# Patient Record
Sex: MALE | Race: WHITE | Employment: FULL TIME | URBAN - METROPOLITAN AREA
[De-identification: names, ages, dates, MRNs, and addresses within clinical notes are randomized per-mention and may not be internally consistent; named-entity substitution may affect disease eponyms.]

---

## 2017-09-10 DIAGNOSIS — M77.8 TENDINITIS OF ELBOW: ICD-10-CM

## 2017-09-11 RX ORDER — IBUPROFEN 800 MG/1
TABLET ORAL
Qty: 180 TABLET | Refills: 3 | OUTPATIENT
Start: 2017-09-11

## 2017-09-14 DIAGNOSIS — L65.9 ALOPECIA: ICD-10-CM

## 2017-09-14 DIAGNOSIS — M77.8 TENDINITIS OF ELBOW: ICD-10-CM

## 2017-09-14 RX ORDER — IBUPROFEN 800 MG/1
TABLET ORAL
Qty: 180 TABLET | Refills: 3 | Status: SHIPPED | OUTPATIENT
Start: 2017-09-14 | End: 2018-02-15

## 2017-09-14 RX ORDER — FINASTERIDE 5 MG/1
5 TABLET, FILM COATED ORAL DAILY
Qty: 30 TABLET | Refills: 3 | Status: SHIPPED | OUTPATIENT
Start: 2017-09-14 | End: 2018-02-15

## 2018-02-05 DIAGNOSIS — L65.9 ALOPECIA: ICD-10-CM

## 2018-02-05 RX ORDER — FINASTERIDE 5 MG/1
5 TABLET, FILM COATED ORAL DAILY
Qty: 30 TABLET | Refills: 3 | OUTPATIENT
Start: 2018-02-05 | End: 2019-02-05

## 2018-02-15 ENCOUNTER — OFFICE VISIT (OUTPATIENT)
Dept: PRIMARY CARE CLINIC | Age: 48
End: 2018-02-15
Payer: COMMERCIAL

## 2018-02-15 VITALS
HEIGHT: 73 IN | BODY MASS INDEX: 24.31 KG/M2 | RESPIRATION RATE: 16 BRPM | OXYGEN SATURATION: 98 % | SYSTOLIC BLOOD PRESSURE: 122 MMHG | TEMPERATURE: 97.5 F | WEIGHT: 183.4 LBS | HEART RATE: 87 BPM | DIASTOLIC BLOOD PRESSURE: 80 MMHG

## 2018-02-15 DIAGNOSIS — Z00.00 PREVENTATIVE HEALTH CARE: Primary | ICD-10-CM

## 2018-02-15 PROCEDURE — 99396 PREV VISIT EST AGE 40-64: CPT | Performed by: INTERNAL MEDICINE

## 2018-02-15 RX ORDER — FINASTERIDE 5 MG/1
5 TABLET, FILM COATED ORAL DAILY
Qty: 90 TABLET | Refills: 3 | Status: CANCELLED | OUTPATIENT
Start: 2018-02-15 | End: 2019-02-15

## 2018-02-15 ASSESSMENT — ENCOUNTER SYMPTOMS
SHORTNESS OF BREATH: 0
VOMITING: 0
NAUSEA: 0
TROUBLE SWALLOWING: 0
CHOKING: 0
VOICE CHANGE: 0
PHOTOPHOBIA: 0

## 2018-02-15 ASSESSMENT — PATIENT HEALTH QUESTIONNAIRE - PHQ9
SUM OF ALL RESPONSES TO PHQ QUESTIONS 1-9: 0
SUM OF ALL RESPONSES TO PHQ9 QUESTIONS 1 & 2: 0
2. FEELING DOWN, DEPRESSED OR HOPELESS: 0
1. LITTLE INTEREST OR PLEASURE IN DOING THINGS: 0

## 2018-02-15 NOTE — PROGRESS NOTES
Bouchra Man 50 y.o. male presents today with   Chief Complaint   Patient presents with    Annual Exam     physical, patient states he will be getting labwork done for his insurance next week. patient would like a refill on finasteride, but discuss adjusting the dosage. HPIannual exam  He off the proscar, he was taking it for alopecia. He is of the motrin. Past Medical History:   Diagnosis Date    Alopecia      There are no active problems to display for this patient. No past surgical history on file. No family history on file. Social History     Social History    Marital status:      Spouse name: N/A    Number of children: N/A    Years of education: N/A     Social History Main Topics    Smoking status: Never Smoker    Smokeless tobacco: Never Used    Alcohol use Yes      Comment: 2 beers daily    Drug use: No    Sexual activity: Not Asked     Other Topics Concern    None     Social History Narrative    None     No Known Allergies    Review of Systems   Constitutional: Negative for fatigue and fever. HENT: Negative for trouble swallowing and voice change. Eyes: Negative for photophobia and visual disturbance. Respiratory: Negative for choking and shortness of breath. Cardiovascular: Negative for chest pain and palpitations. Gastrointestinal: Negative for nausea and vomiting. Genitourinary: Negative for decreased urine volume, testicular pain and urgency. Skin: Negative for rash. Neurological: Negative for tremors and syncope. Hematological: Does not bruise/bleed easily. Psychiatric/Behavioral: Negative for suicidal ideas. Vitals:    02/15/18 1011   BP: 122/80   Site: Right Arm   Position: Sitting   Cuff Size: Small Adult   Pulse: 87   Resp: 16   Temp: 97.5 °F (36.4 °C)   TempSrc: Tympanic   SpO2: 98%   Weight: 183 lb 6.4 oz (83.2 kg)   Height: 6' 1\" (1.854 m)       Physical Exam   Constitutional: He appears well-developed and well-nourished.    HENT: Head: Normocephalic and atraumatic. Eyes: Conjunctivae and EOM are normal. Pupils are equal, round, and reactive to light. Neck: Normal range of motion. No thyromegaly present. Cardiovascular: Normal rate and regular rhythm. Pulmonary/Chest: Effort normal. No respiratory distress. He has no wheezes. Abdominal: Soft. Bowel sounds are normal. He exhibits no distension. Musculoskeletal: Normal range of motion. He exhibits no edema. Neurological: He is alert. Skin: Skin is warm. Psychiatric: He has a normal mood and affect. Assessment/Plan  1. Preventative health care  PSA Screening    Comprehensive Metabolic Panel    CBC With Auto Differential    Lipid Panel         Return if symptoms worsen or fail to improve.     Manolo Jernigan MD

## 2018-04-25 DIAGNOSIS — L65.9 ALOPECIA: ICD-10-CM

## 2018-04-26 RX ORDER — FINASTERIDE 5 MG/1
5 TABLET, FILM COATED ORAL DAILY
Qty: 90 TABLET | Refills: 1 | Status: SHIPPED | OUTPATIENT
Start: 2018-04-26 | End: 2018-11-06 | Stop reason: SDUPTHER

## 2018-11-06 DIAGNOSIS — L65.9 ALOPECIA: ICD-10-CM

## 2018-11-06 RX ORDER — FINASTERIDE 5 MG/1
5 TABLET, FILM COATED ORAL DAILY
Qty: 90 TABLET | Refills: 1 | Status: SHIPPED | OUTPATIENT
Start: 2018-11-06 | End: 2019-05-17 | Stop reason: SDUPTHER

## 2018-11-21 DIAGNOSIS — L65.9 ALOPECIA: ICD-10-CM

## 2018-11-21 RX ORDER — FINASTERIDE 5 MG/1
5 TABLET, FILM COATED ORAL DAILY
Qty: 90 TABLET | Refills: 1 | Status: SHIPPED | OUTPATIENT
Start: 2018-11-21 | End: 2020-01-10

## 2019-05-17 DIAGNOSIS — L65.9 ALOPECIA: ICD-10-CM

## 2019-05-17 RX ORDER — FINASTERIDE 5 MG/1
5 TABLET, FILM COATED ORAL DAILY
Qty: 90 TABLET | Refills: 1 | Status: SHIPPED | OUTPATIENT
Start: 2019-05-17 | End: 2020-01-10 | Stop reason: SDUPTHER

## 2020-01-10 ENCOUNTER — OFFICE VISIT (OUTPATIENT)
Dept: PRIMARY CARE CLINIC | Age: 50
End: 2020-01-10
Payer: COMMERCIAL

## 2020-01-10 VITALS
HEIGHT: 72 IN | TEMPERATURE: 97.8 F | RESPIRATION RATE: 14 BRPM | OXYGEN SATURATION: 98 % | WEIGHT: 185.4 LBS | HEART RATE: 77 BPM | SYSTOLIC BLOOD PRESSURE: 122 MMHG | DIASTOLIC BLOOD PRESSURE: 76 MMHG | BODY MASS INDEX: 25.11 KG/M2

## 2020-01-10 PROCEDURE — G8484 FLU IMMUNIZE NO ADMIN: HCPCS | Performed by: INTERNAL MEDICINE

## 2020-01-10 PROCEDURE — 99396 PREV VISIT EST AGE 40-64: CPT | Performed by: INTERNAL MEDICINE

## 2020-01-10 RX ORDER — FINASTERIDE 5 MG/1
5 TABLET, FILM COATED ORAL
COMMUNITY
Start: 2018-11-21 | End: 2020-01-10

## 2020-01-10 RX ORDER — FINASTERIDE 5 MG/1
5 TABLET, FILM COATED ORAL DAILY
Qty: 90 TABLET | Refills: 3 | Status: SHIPPED | OUTPATIENT
Start: 2020-01-10 | End: 2021-01-09

## 2020-01-10 RX ORDER — MECLIZINE HCL 12.5 MG/1
12.5 TABLET ORAL
COMMUNITY
Start: 2019-01-30

## 2020-01-10 SDOH — ECONOMIC STABILITY: FOOD INSECURITY: WITHIN THE PAST 12 MONTHS, THE FOOD YOU BOUGHT JUST DIDN'T LAST AND YOU DIDN'T HAVE MONEY TO GET MORE.: NEVER TRUE

## 2020-01-10 SDOH — ECONOMIC STABILITY: INCOME INSECURITY: HOW HARD IS IT FOR YOU TO PAY FOR THE VERY BASICS LIKE FOOD, HOUSING, MEDICAL CARE, AND HEATING?: NOT HARD AT ALL

## 2020-01-10 SDOH — ECONOMIC STABILITY: FOOD INSECURITY: WITHIN THE PAST 12 MONTHS, YOU WORRIED THAT YOUR FOOD WOULD RUN OUT BEFORE YOU GOT MONEY TO BUY MORE.: NEVER TRUE

## 2020-01-10 ASSESSMENT — PATIENT HEALTH QUESTIONNAIRE - PHQ9
1. LITTLE INTEREST OR PLEASURE IN DOING THINGS: 0
2. FEELING DOWN, DEPRESSED OR HOPELESS: 0
SUM OF ALL RESPONSES TO PHQ QUESTIONS 1-9: 0
SUM OF ALL RESPONSES TO PHQ QUESTIONS 1-9: 0
SUM OF ALL RESPONSES TO PHQ9 QUESTIONS 1 & 2: 0

## 2020-01-10 ASSESSMENT — ENCOUNTER SYMPTOMS
TROUBLE SWALLOWING: 0
VOICE CHANGE: 0
PHOTOPHOBIA: 0
CHOKING: 0
NAUSEA: 0
SHORTNESS OF BREATH: 0
VOMITING: 0

## 2020-01-10 NOTE — PROGRESS NOTES
Cardiovascular: Negative for chest pain and palpitations. Gastrointestinal: Negative for abdominal distention, constipation, nausea and vomiting. Genitourinary: Negative for decreased urine volume, testicular pain and urgency. Skin: Negative for rash. Neurological: Positive for dizziness (recurrent vertigo). Negative for tremors and syncope. Hematological: Does not bruise/bleed easily. Psychiatric/Behavioral: Positive for agitation. Negative for suicidal ideas. Vitals:    01/10/20 0920   BP: 122/76   Pulse: 77   Resp: 14   Temp: 97.8 °F (36.6 °C)   SpO2: 98%   Weight: 185 lb 6.4 oz (84.1 kg)   Height: 6' (1.829 m)       Physical Exam  Constitutional:       Appearance: He is well-developed. HENT:      Head: Normocephalic and atraumatic. Nose: Nose normal.      Mouth/Throat:      Mouth: Mucous membranes are moist.   Eyes:      Conjunctiva/sclera: Conjunctivae normal.      Pupils: Pupils are equal, round, and reactive to light. Neck:      Musculoskeletal: Normal range of motion. Cardiovascular:      Rate and Rhythm: Normal rate and regular rhythm. Pulmonary:      Effort: Pulmonary effort is normal. No respiratory distress. Breath sounds: No wheezing or rales. Abdominal:      General: Bowel sounds are normal. There is no distension. Palpations: Abdomen is soft. Tenderness: There is no tenderness. Musculoskeletal: Normal range of motion. Skin:     General: Skin is dry. Coloration: Skin is not jaundiced. Neurological:      Mental Status: He is alert. Cranial Nerves: No cranial nerve deficit. Psychiatric:         Mood and Affect: Mood normal.       Assessment/Plan  Reinaldo Brain was seen today for check-up. Diagnoses and all orders for this visit:    Preventative health care  -     Comprehensive Metabolic Panel; Future  -     CBC With Auto Differential; Future  -     Lipid Panel;  Future    Gastroesophageal reflux disease without esophagitis  - [facility], [facility]    Alopecia  -     finasteride (PROSCAR) 5 MG tablet; Take 1 tablet by mouth daily    Raynaud's disease without gangrene  -     DARRELL; Future    Vitamin D deficiency    Vitamin B 12 deficiency  -     Vitamin B12; Future    Hypothyroidism, unspecified type  -     TSH Without Reflex; Future    Hyperglycemia  -     Hemoglobin A1C; Future    Vegetarian diet  -     Iron And Tibc; Future    Prostate cancer screening  -     PSA Screening; Future    Colon cancer screening  -     [facility], [facility]        No follow-ups on file.     Nena Christianson MD

## 2020-01-11 LAB
ALBUMIN SERPL-MCNC: 4.3 G/DL
ALP BLD-CCNC: 73 U/L
ALT SERPL-CCNC: 29 U/L
ANA TITER: NEGATIVE
ANION GAP SERPL CALCULATED.3IONS-SCNC: NORMAL MMOL/L
AST SERPL-CCNC: 19 U/L
AVERAGE GLUCOSE: NORMAL
BASOPHILS ABSOLUTE: 52 /ΜL
BASOPHILS RELATIVE PERCENT: 1 %
BILIRUB SERPL-MCNC: 0.6 MG/DL (ref 0.1–1.4)
BUN BLDV-MCNC: 14 MG/DL
CALCIUM SERPL-MCNC: 9.5 MG/DL
CHLORIDE BLD-SCNC: 103 MMOL/L
CHOLESTEROL, TOTAL: 253 MG/DL
CHOLESTEROL/HDL RATIO: 4
CO2: 31 MMOL/L
CREAT SERPL-MCNC: 0.72 MG/DL
EOSINOPHILS ABSOLUTE: 109 /ΜL
EOSINOPHILS RELATIVE PERCENT: 2.1 %
GFR CALCULATED: NORMAL
GLUCOSE BLD-MCNC: 106 MG/DL
HBA1C MFR BLD: 5.5 %
HCT VFR BLD CALC: 43.8 % (ref 41–53)
HDLC SERPL-MCNC: 64 MG/DL (ref 35–70)
HEMOGLOBIN: 14.9 G/DL (ref 13.5–17.5)
IRON: 130
LDL CHOLESTEROL CALCULATED: 166 MG/DL (ref 0–160)
LYMPHOCYTES ABSOLUTE: 1648 /ΜL
LYMPHOCYTES RELATIVE PERCENT: 31.7 %
MCH RBC QN AUTO: 30.7 PG
MCHC RBC AUTO-ENTMCNC: 34 G/DL
MCV RBC AUTO: 90.1 FL
MONOCYTES ABSOLUTE: 515 /ΜL
MONOCYTES RELATIVE PERCENT: 9.9 %
NEUTROPHILS ABSOLUTE: 2876 /ΜL
NEUTROPHILS RELATIVE PERCENT: 55.3 %
PDW BLD-RTO: 12.8 %
PLATELET # BLD: 244 K/ΜL
PMV BLD AUTO: 11.5 FL
POTASSIUM SERPL-SCNC: 4.5 MMOL/L
PROSTATE SPECIFIC ANTIGEN: 0.6 NG/ML
RBC # BLD: 4.86 10^6/ΜL
SODIUM BLD-SCNC: 140 MMOL/L
TOTAL IRON BINDING CAPACITY: 356
TOTAL PROTEIN: 7.2
TRIGL SERPL-MCNC: 112 MG/DL
TSH SERPL DL<=0.05 MIU/L-ACNC: 4.39 UIU/ML
VITAMIN B-12: 776
VLDLC SERPL CALC-MCNC: ABNORMAL MG/DL
WBC # BLD: 5.2 10^3/ML

## 2020-01-15 ASSESSMENT — ENCOUNTER SYMPTOMS
ABDOMINAL DISTENTION: 0
CONSTIPATION: 0

## 2020-02-04 ENCOUNTER — OFFICE VISIT (OUTPATIENT)
Dept: GASTROENTEROLOGY | Age: 50
End: 2020-02-04
Payer: COMMERCIAL

## 2020-02-04 VITALS
OXYGEN SATURATION: 97 % | BODY MASS INDEX: 24.65 KG/M2 | WEIGHT: 182 LBS | SYSTOLIC BLOOD PRESSURE: 130 MMHG | HEART RATE: 79 BPM | HEIGHT: 72 IN | DIASTOLIC BLOOD PRESSURE: 80 MMHG

## 2020-02-04 PROCEDURE — G8427 DOCREV CUR MEDS BY ELIG CLIN: HCPCS | Performed by: INTERNAL MEDICINE

## 2020-02-04 PROCEDURE — 3017F COLORECTAL CA SCREEN DOC REV: CPT | Performed by: INTERNAL MEDICINE

## 2020-02-04 PROCEDURE — 1036F TOBACCO NON-USER: CPT | Performed by: INTERNAL MEDICINE

## 2020-02-04 PROCEDURE — 99204 OFFICE O/P NEW MOD 45 MIN: CPT | Performed by: INTERNAL MEDICINE

## 2020-02-04 PROCEDURE — G8420 CALC BMI NORM PARAMETERS: HCPCS | Performed by: INTERNAL MEDICINE

## 2020-02-04 PROCEDURE — G8484 FLU IMMUNIZE NO ADMIN: HCPCS | Performed by: INTERNAL MEDICINE

## 2020-02-04 RX ORDER — OMEPRAZOLE/SODIUM BICARBONATE 20MG-1.1G
CAPSULE ORAL
COMMUNITY
End: 2020-06-04

## 2020-02-04 ASSESSMENT — ENCOUNTER SYMPTOMS
NAUSEA: 1
ABDOMINAL DISTENTION: 0
SHORTNESS OF BREATH: 0
COLOR CHANGE: 0
EYE REDNESS: 0
CONSTIPATION: 0
EYE PAIN: 0
RECTAL PAIN: 0
BLOOD IN STOOL: 0
PHOTOPHOBIA: 0
VOMITING: 0
VOICE CHANGE: 0
WHEEZING: 0
CHEST TIGHTNESS: 0
DIARRHEA: 1
TROUBLE SWALLOWING: 0
ABDOMINAL PAIN: 0

## 2020-02-04 NOTE — PROGRESS NOTES
Subjective:      Patient ID: Carmen Donovan is a 44 y.o. male who presents today for:  Chief Complaint   Patient presents with    Consultation    Gastroesophageal Reflux    Diarrhea       HPI  This is a very pleasant 59-year-old that came in today for the evaluation management of GERD and loose stool. Patient with longstanding history of GERD has been on any PPI as of lately Zegerid. He does report sensation of heartburn or discomfort. He he mentioned that symptoms get worse in winter months and resolve in summer. Patient report this is dependent on the medication. He mentioned that at some point he was off PPI while in [place] however symptoms were severe. Denies any dysphasia or difficulty swallowing. Patient also has been reporting watery stool and loose stool. Denies any hematemesis hematochezia. Had blood work that shows no evidence of anemia. Otherwise patient came in today for the initial visit. No prior EGD colonoscopy reported. Past Medical History:   Diagnosis Date    Alopecia      History reviewed. No pertinent surgical history.   Social History     Socioeconomic History    Marital status:      Spouse name: Not on file    Number of children: Not on file    Years of education: Not on file    Highest education level: Not on file   Occupational History    Not on file   Social Needs    Financial resource strain: Not hard at all   [employer] insecurity:     Worry: Never true     Inability: Never true   [employer] needs:     Medical: Not on file     Non-medical: Not on file   Tobacco Use    Smoking status: Never Smoker    Smokeless tobacco: Never Used   Substance and Sexual Activity    Alcohol use: Yes     Comment: 2 beers daily    Drug use: No    Sexual activity: Not on file   Lifestyle    Physical activity:     Days per week: Not on file     Minutes per session: Not on file    Stress: Not on file   Relationships    Social connections:     Talks on phone: Not on file Gets together: Not on file     Attends Christian service: Not on file     Active member of club or organization: Not on file     Attends meetings of clubs or organizations: Not on file     Relationship status: Not on file    Intimate partner violence:     Fear of current or ex partner: Not on file     Emotionally abused: Not on file     Physically abused: Not on file     Forced sexual activity: Not on file   Other Topics Concern    Not on file   Social History Narrative    Not on file     Family History   Problem Relation Age of Onset    Colon Cancer Neg Hx      No Known Allergies      Review of Systems   Constitutional: Negative for appetite change, chills, fatigue, fever and unexpected weight change. HENT: Negative for nosebleeds, tinnitus, trouble swallowing and voice change. Eyes: Negative for photophobia, pain and redness. Respiratory: Negative for chest tightness, shortness of breath and wheezing. Cardiovascular: Negative for chest pain, palpitations and leg swelling. Gastrointestinal: Positive for diarrhea and nausea. Negative for abdominal distention, abdominal pain, blood in stool, constipation, rectal pain and vomiting. Endocrine: Negative for polydipsia, polyphagia and polyuria. Genitourinary: Negative for difficulty urinating and hematuria. Skin: Negative for color change, pallor and rash. Neurological: Negative for dizziness, speech difficulty and headaches. Psychiatric/Behavioral: Negative for confusion and suicidal ideas. Objective:   /80 (Site: Right Upper Arm, Position: Sitting, Cuff Size: Medium Adult)   Pulse 79   Ht 6' (1.829 m)   Wt 182 lb (82.6 kg)   SpO2 97%   BMI 24.68 kg/m²     Physical Exam  Constitutional:       General: He is not in acute distress. Appearance: He is well-developed. HENT:      Head: Normocephalic and atraumatic. Eyes:      Conjunctiva/sclera: Conjunctivae normal.      Pupils: Pupils are equal, round, and reactive to light. Neck:      Musculoskeletal: Neck supple. Cardiovascular:      Rate and Rhythm: Normal rate and regular rhythm. Heart sounds: Normal heart sounds. Pulmonary:      Effort: Pulmonary effort is normal. No respiratory distress. Breath sounds: Normal breath sounds. No wheezing or rales. Abdominal:      General: Bowel sounds are normal. There is no distension. Palpations: Abdomen is soft. Abdomen is not rigid. There is no hepatomegaly, splenomegaly or mass. Tenderness: There is no abdominal tenderness. There is no guarding or rebound. Musculoskeletal: Normal range of motion. General: No tenderness or deformity. Skin:     Coloration: Skin is not pale. Findings: No erythema or rash. Neurological:      Mental Status: He is alert and oriented to person, place, and time. Laboratory, Pathology, Radiology reviewed in detail with relevantimportant investigations summarized below:  Lab Results   Component Value Date    WBC 5.2 01/11/2020    HGB 14.9 01/11/2020    HCT 43.8 01/11/2020    MCV 90.1 01/11/2020     01/11/2020    . Lab Results   Component Value Date    ALT 29 01/11/2020    AST 19 01/11/2020    ALKPHOS 73 01/11/2020    BILITOT 0.6 01/11/2020       No results found.   Lab Results   Component Value Date    IRON 130 01/11/2020    TIBC 356 01/11/2020     No results found for: INR  No components found for: ACUTEHEPATITISSCREEN  No components found for: CELIACPANEL  No components found for: STOOLCULTURE, C.DIFF, STOOLOVAPARASITE, STOOLLEUCOCYTE        Assessment:            1- Heartburn / GERD  = Advised on the correct dose and timing of the PPI, 20 to 30 min before breakfast   = Pathophysiology and etiology of reflux discussed at length  = Lifestyle modification recommended and discussed with the patient   --Avoid spicy and acidic based foods   --Limit coffee, tea, alcohol use   --Limit the amount of food during meal time   --Avoid bedtime snacks and eat meals 3 to 4 hrs before lying down   --Elevate the head of the bed    --Keep healthy weight  EGD requested to assess for heartburn/GERD related complication. Assess the presence of hiatal hernia. The upper endoscopy procedure, complications, risk and benefit were reviewed. Patient would like to proceed accordingly. We will obtain duodenal biopsies to assess for celiac given the associated epigastric pain and diarrhea  2-Loose stool intermittent in nature  We will proceed with initial evaluation including colonoscopy with terminal ileum intubation. Patient is due for screening as well. NO Family history of premature bowel disease or CRC. Return in about 3 months (around 5/4/2020) for Post procedure results discussion, further management.       Ramez Singh MD

## 2020-02-04 NOTE — PROGRESS NOTES
97%   BMI 24.68 kg/m²     Physical Exam    Laboratory, Pathology, Radiology reviewed in detail with relevantimportant investigations summarized below:  Lab Results   Component Value Date    WBC 5.2 01/11/2020    HGB 14.9 01/11/2020    HCT 43.8 01/11/2020    MCV 90.1 01/11/2020     01/11/2020    . Lab Results   Component Value Date    ALT 29 01/11/2020    AST 19 01/11/2020    ALKPHOS 73 01/11/2020    BILITOT 0.6 01/11/2020       No results found. Lab Results   Component Value Date    IRON 130 01/11/2020    TIBC 356 01/11/2020     No results found for: INR  No components found for: ACUTEHEPATITISSCREEN  No components found for: CELIACPANEL  No components found for: STOOLCULTURE, C.DIFF, STOOLOVAPARASITE, STOOLLEUCOCYTE        Assessment:       Diagnosis Orders   1. Gastroesophageal reflux disease, esophagitis presence not specified  EGD   2. Diarrhea, unspecified type  Endoscopy, colon, diagnostic         Plan:      Orders Placed This Encounter   Procedures    EGD     Standing Status:   Future     Standing Expiration Date:   6/4/2020     Order Specific Question:   Screening or Diagnostic? Answer:   Diagnostic    Endoscopy, colon, diagnostic     Standing Status:   Future     Standing Expiration Date:   8/4/2020     Order Specific Question:   Pre-procedure Diagnosis     Answer:   abdominal pain     Orders Placed This Encounter   Medications    Sod Picosulfate-Mag Ox-Cit Acd (CLENPIQ) 10-3.5-12 MG-GM -GM/160ML SOLN     Sig: Take as directed     Dispense:  320 mL     Refill:  0       No follow-ups on file.       Sal Jasso MD

## 2020-02-24 ENCOUNTER — OUTSIDE SERVICES (OUTPATIENT)
Dept: GASTROENTEROLOGY | Age: 50
End: 2020-02-24
Payer: COMMERCIAL

## 2020-02-24 PROBLEM — R51.9 ACUTE NONINTRACTABLE HEADACHE: Status: ACTIVE | Noted: 2019-01-30

## 2020-02-24 PROBLEM — K21.9 GASTROESOPHAGEAL REFLUX DISEASE: Status: ACTIVE | Noted: 2018-04-02

## 2020-02-24 PROBLEM — R42 VERTIGO: Status: ACTIVE | Noted: 2019-01-30

## 2020-02-24 PROCEDURE — 45378 DIAGNOSTIC COLONOSCOPY: CPT | Performed by: INTERNAL MEDICINE

## 2020-02-24 PROCEDURE — 43239 EGD BIOPSY SINGLE/MULTIPLE: CPT | Performed by: INTERNAL MEDICINE

## 2020-02-24 NOTE — LETTER
4/8/2020   Marielos Contreras[address]vae[id number]             Dear Marielos Simmons,     Your upper endoscopy performed on 02/24/2020  reveled Harris's esophagus. Harris's esophagus is a condition that affects the esophagus (the tube that carries food from the mouth to the stomach) . When people have Harris's esophagus, the normal cells in the lower part of their esophagus are replaced by a different type of cell. Harris's esophagus is usually caused by acid reflux. Acid reflux is when the acid that is normally in your stomach backs up into the esophagus. Harris's esophagus can later turn into pre-cancer or cancer of the esophagus. You should continue your antireflux precautions and your anti acid medication . In addition, we recommend  a repeat upper endoscopy in 3 years. We also recommend a colonoscopy for colon cancer screening in 5 years. We hope you are doing well, and if you have any questions please contact me at [phone]. Thank you for choosing [address] [place] [address] for your healthcare.      Sincerely,     Jeffrey Matsno MD

## 2020-04-08 RX ORDER — OMEPRAZOLE 40 MG/1
40 CAPSULE, DELAYED RELEASE ORAL
Qty: 30 CAPSULE | Refills: 5 | Status: SHIPPED | OUTPATIENT
Start: 2020-04-08 | End: 2020-06-18

## 2020-04-08 NOTE — RESULT ENCOUNTER NOTE
4/8/2020  Verner Eke  [place] [address]    Dear Verner Eke,    Your upper endoscopy performed on 02/24/2020  reveled Harris's esophagus. Harris's esophagus is a condition that affects the esophagus (the tube that carries food from the mouth to the stomach) . When people have Harris's esophagus, the normal cells in the lower part of their esophagus are replaced by a different type of cell. Harris's esophagus is usually caused by acid reflux. Acid reflux is when the acid that is normally in your stomach backs up into the esophagus. Harris's esophagus can later turn into pre-cancer or cancer of the esophagus. You should continue your antireflux precautions and your anti acid medication . In addition, we recommend  a repeat upper endoscopy in 3 years. We also recommend a colonoscopy for colon cancer screening in 5 years. We hope you are doing well, and if you have any questions please contact me at [phone]. Thank you for choosing [facility] for your healthcare.     Sincerely,     Jolene Koroma MD

## 2020-04-09 ENCOUNTER — TELEPHONE (OUTPATIENT)
Dept: GASTROENTEROLOGY | Age: 50
End: 2020-04-09

## 2020-04-09 NOTE — TELEPHONE ENCOUNTER
----- Message from Chapo Corona MD sent at 4/8/2020 10:10 AM EDT -----  4/8/2020  Jhony Sanches  [facility] [address]    Dear Jhony Sanches,    Your upper endoscopy performed on 02/24/2020  reveled Harris's esophagus. Harris's esophagus is a condition that affects the esophagus (the tube that carries food from the mouth to the stomach) . When people have Harris's esophagus, the normal cells in the lower part of their esophagus are replaced by a different type of cell. Harris's esophagus is usually caused by acid reflux. Acid reflux is when the acid that is normally in your stomach backs up into the esophagus. Harris's esophagus can later turn into pre-cancer or cancer of the esophagus. You should continue your antireflux precautions and your anti acid medication . In addition, we recommend  a repeat upper endoscopy in 3 years. We also recommend a colonoscopy for colon cancer screening in 5 years. We hope you are doing well, and if you have any questions please contact me at [phone]. Thank you for choosing [facility] for your healthcare.     Sincerely,     Chapo Corona MD

## 2020-06-04 ENCOUNTER — OFFICE VISIT (OUTPATIENT)
Dept: PRIMARY CARE CLINIC | Age: 50
End: 2020-06-04
Payer: COMMERCIAL

## 2020-06-04 VITALS
SYSTOLIC BLOOD PRESSURE: 128 MMHG | TEMPERATURE: 97.8 F | HEART RATE: 86 BPM | WEIGHT: 185.6 LBS | DIASTOLIC BLOOD PRESSURE: 80 MMHG | RESPIRATION RATE: 14 BRPM | OXYGEN SATURATION: 98 % | BODY MASS INDEX: 25.14 KG/M2 | HEIGHT: 72 IN

## 2020-06-04 PROCEDURE — G8427 DOCREV CUR MEDS BY ELIG CLIN: HCPCS | Performed by: INTERNAL MEDICINE

## 2020-06-04 PROCEDURE — G8419 CALC BMI OUT NRM PARAM NOF/U: HCPCS | Performed by: INTERNAL MEDICINE

## 2020-06-04 PROCEDURE — 1036F TOBACCO NON-USER: CPT | Performed by: INTERNAL MEDICINE

## 2020-06-04 PROCEDURE — 99214 OFFICE O/P EST MOD 30 MIN: CPT | Performed by: INTERNAL MEDICINE

## 2020-06-04 PROCEDURE — 3017F COLORECTAL CA SCREEN DOC REV: CPT | Performed by: INTERNAL MEDICINE

## 2020-06-04 ASSESSMENT — ENCOUNTER SYMPTOMS
CHOKING: 0
SHORTNESS OF BREATH: 1
PHOTOPHOBIA: 0
TROUBLE SWALLOWING: 0
ABDOMINAL PAIN: 0
NAUSEA: 0
VOMITING: 0

## 2020-06-04 NOTE — PROGRESS NOTES
Luma Vaughan 44 y.o. male presents today with   Chief Complaint   Patient presents with    6 Month Follow-Up     Last few years dealing with issues of lungs/breathing. Symptoms are lingering. Would like a Chest X-Ray        Shortness of Breath   This is a recurrent problem. The current episode started more than 1 year ago. Episode frequency: spring. Duration: feels as if unable take a full breath, like a coating. Pertinent negatives include no abdominal pain, chest pain, fever, rash or vomiting. Exacerbated by: on deep breath. Past Medical History:   Diagnosis Date    Alopecia      Patient Active Problem List    Diagnosis Date Noted    Vertigo 01/30/2019    Acute nonintractable headache 01/30/2019    Gastroesophageal reflux disease 04/02/2018     No past surgical history on file.   Family History   Problem Relation Age of Onset    Colon Cancer Neg Hx      Social History     Socioeconomic History    Marital status:      Spouse name: None    Number of children: None    Years of education: None    Highest education level: None   Occupational History    None   Social Needs    Financial resource strain: Not hard at all   [employer] insecurity     Worry: Never true     Inability: Never true   [employer] needs     Medical: None     Non-medical: None   Tobacco Use    Smoking status: Never Smoker    Smokeless tobacco: Never Used   Substance and Sexual Activity    Alcohol use: Yes     Comment: 2 beers daily    Drug use: No    Sexual activity: None   Lifestyle    Physical activity     Days per week: None     Minutes per session: None    Stress: None   Relationships    Social connections     Talks on phone: None     Gets together: None     Attends Zoroastrianism service: None     Active member of club or organization: None     Attends meetings of clubs or organizations: None     Relationship status: None    Intimate partner violence     Fear of current or ex partner: None     Emotionally abused: Assessment/Plan  Ann Marie Bennett was seen today for 6 month follow-up. Diagnoses and all orders for this visit:    SOB (shortness of breath)  -     Juancarlos Corrales MD, Pulmonology, [place]  -     XR CHEST STANDARD (2 VW); Future    Pure hypercholesterolemia  -     Lipid Panel; Future    discuss the importance of normal or low cholesterol  Return in about 3 months (around 9/4/2020), or if symptoms worsen or fail to improve.     Ricci Padilla MD

## 2020-06-09 ENCOUNTER — TELEPHONE (OUTPATIENT)
Dept: PRIMARY CARE CLINIC | Age: 50
End: 2020-06-09

## 2020-06-14 ENCOUNTER — HOSPITAL ENCOUNTER (OUTPATIENT)
Dept: GENERAL RADIOLOGY | Age: 50
Discharge: HOME | End: 2020-06-16
Payer: COMMERCIAL

## 2020-06-14 PROCEDURE — 71046 X-RAY EXAM CHEST 2 VIEWS: CPT

## 2020-06-18 ENCOUNTER — VIRTUAL VISIT (OUTPATIENT)
Dept: GASTROENTEROLOGY | Age: 50
End: 2020-06-18
Payer: COMMERCIAL

## 2020-06-18 ENCOUNTER — OFFICE VISIT (OUTPATIENT)
Dept: CARDIOLOGY CLINIC | Age: 50
End: 2020-06-18
Payer: COMMERCIAL

## 2020-06-18 VITALS
DIASTOLIC BLOOD PRESSURE: 88 MMHG | BODY MASS INDEX: 24.74 KG/M2 | TEMPERATURE: 97 F | OXYGEN SATURATION: 99 % | HEART RATE: 74 BPM | SYSTOLIC BLOOD PRESSURE: 132 MMHG | RESPIRATION RATE: 14 BRPM | WEIGHT: 182.4 LBS

## 2020-06-18 PROBLEM — R07.2 PRECORDIAL PAIN: Status: ACTIVE | Noted: 2020-06-18

## 2020-06-18 PROBLEM — E78.00 HYPERCHOLESTEREMIA: Status: ACTIVE | Noted: 2020-06-18

## 2020-06-18 PROCEDURE — 99203 OFFICE O/P NEW LOW 30 MIN: CPT | Performed by: INTERNAL MEDICINE

## 2020-06-18 PROCEDURE — 3017F COLORECTAL CA SCREEN DOC REV: CPT | Performed by: NURSE PRACTITIONER

## 2020-06-18 PROCEDURE — G8427 DOCREV CUR MEDS BY ELIG CLIN: HCPCS | Performed by: NURSE PRACTITIONER

## 2020-06-18 PROCEDURE — 1036F TOBACCO NON-USER: CPT | Performed by: NURSE PRACTITIONER

## 2020-06-18 PROCEDURE — 99214 OFFICE O/P EST MOD 30 MIN: CPT | Performed by: NURSE PRACTITIONER

## 2020-06-18 PROCEDURE — 93000 ELECTROCARDIOGRAM COMPLETE: CPT | Performed by: INTERNAL MEDICINE

## 2020-06-18 PROCEDURE — G8420 CALC BMI NORM PARAMETERS: HCPCS | Performed by: NURSE PRACTITIONER

## 2020-06-18 PROCEDURE — G8427 DOCREV CUR MEDS BY ELIG CLIN: HCPCS | Performed by: INTERNAL MEDICINE

## 2020-06-18 PROCEDURE — 3017F COLORECTAL CA SCREEN DOC REV: CPT | Performed by: INTERNAL MEDICINE

## 2020-06-18 PROCEDURE — G8420 CALC BMI NORM PARAMETERS: HCPCS | Performed by: INTERNAL MEDICINE

## 2020-06-18 PROCEDURE — 1036F TOBACCO NON-USER: CPT | Performed by: INTERNAL MEDICINE

## 2020-06-18 RX ORDER — OMEPRAZOLE 40 MG/1
40 CAPSULE, DELAYED RELEASE ORAL DAILY
Qty: 90 CAPSULE | Refills: 1 | Status: SHIPPED | OUTPATIENT
Start: 2020-06-18 | End: 2020-08-17 | Stop reason: CLARIF

## 2020-06-18 RX ORDER — OMEPRAZOLE 20 MG/1
20 CAPSULE, DELAYED RELEASE ORAL DAILY
COMMUNITY
End: 2020-06-18

## 2020-06-18 ASSESSMENT — ENCOUNTER SYMPTOMS
NAUSEA: 0
DIARRHEA: 0
CHEST TIGHTNESS: 0
VOMITING: 0
TROUBLE SWALLOWING: 0
VOICE CHANGE: 0
ABDOMINAL PAIN: 1
EYE PAIN: 0
CONSTIPATION: 0
ABDOMINAL DISTENTION: 0
RECTAL PAIN: 0
EYE REDNESS: 0
COLOR CHANGE: 0
PHOTOPHOBIA: 0
ANAL BLEEDING: 0
BLOOD IN STOOL: 0

## 2020-06-18 NOTE — PROGRESS NOTES
rhonchi, symmetric air entry  Heart - normal rate, regular rhythm, normal S1, S2, no murmurs, rubs, clicks or gallops  Abdomen - soft, nontender, nondistended, no masses or organomegaly  Neurological - alert, oriented, normal speech, no focal findings or movement disorder noted  Extremities - peripheral pulses normal, no pedal edema, no clubbing or cyanosis  Skin - normal coloration and turgor, no rashes, no suspicious skin lesions noted      EKG: normal sinus rhythm, RBBB    Orders Placed This Encounter   Procedures    EKG 12 lead    ECHO Stress Test       ASSESSMENT:     Diagnosis Orders   1. Chest pain, unspecified type  EKG 12 lead    ECHO Stress Test   2. Precordial pain  ECHO Stress Test   3. Hypercholesteremia           PLAN:     Patient will need to continue to follow up with you for their general medical care     As always, aggressive risk factor modification is strongly recommended. We should adhere to the JNC VIII guidelines for HTN management and the NCEP ATP III guidelines for LDL-C management. Cardiac diet is always recommended with low fat, cholesterol, calories and sodium. Continue medications at current doses. Plan for stress echo     PPI for GERD    Will continue to monitor patient clinically, if symptoms develop or worsen, they are to let me know ASAP or head to the nearest emergeny room. Details of medical condition explained and patient was warned about adverse consequences of uncontrolled medical conditions and possible side effects of prescribed medications.

## 2020-06-18 NOTE — PROGRESS NOTES
2020    TELEHEALTH EVALUATION -- Audio/Visual (During YCRYI-40 public health emergency)    Due to Matth[place] 19 outbreak, patient's office visit was converted to a virtual visit. Patient was contacted and agreed to proceed with a virtual visit via [email or web address]  The risks and benefits of converting to a virtual visit were discussed in light of the current infectious disease epidemic. Patient also understood that insurance coverage and co-pays are up to their individual insurance plans. HPI:    Kearney Ganser (:  1970) has requested an audio/video evaluation for the following concern(s):GERD, patient was previously seen in the office by Dr. Rebeca Oconnor with complaints of GERD has been taking Prilosec 20 mg daily, underwent an EGD in February those results are noted below and were discussed with patient. He was recently seen by cardiology for complaints of epigastric pain and encouraged to follow-up with GI as cardiac work-up was negative. He has complaints of increased GERD symptoms approximately 3 times a week, describes this as epigastric burning, does experience nocturnal symptoms 2 times a week. Has been taking Prilosec 20 mg once daily however he was prescribed 40 once daily, drinking 6 cups of coffee a day, taking 2 Aleve a day, drinking 3-4 beers a day, and eating within 1 hour of bedtime. He denies nausea or vomiting, hematemesis, melena, or hematochezia. Denies shortness of breath or dizziness. Endoscopic hx: 20 Dr Rebeca Oconnor  Irregular Z line, short segment Harris's, fundic gland polyps  bx consistent with Harris's no dysplasia, and mild chronic gastroesophagitis. Background: This is a very pleasant 75-year-old that came in today for the evaluation management of GERD and loose stool. Patient with longstanding history of GERD has been on any PPI as of lately Zegerid. He does report sensation of heartburn or discomfort.   He he mentioned that symptoms get worse in winter months and

## 2020-07-01 ENCOUNTER — OFFICE VISIT (OUTPATIENT)
Dept: PULMONOLOGY | Age: 50
End: 2020-07-01
Payer: COMMERCIAL

## 2020-07-01 VITALS
TEMPERATURE: 97.9 F | DIASTOLIC BLOOD PRESSURE: 80 MMHG | RESPIRATION RATE: 14 BRPM | WEIGHT: 180.8 LBS | BODY MASS INDEX: 24.49 KG/M2 | OXYGEN SATURATION: 98 % | HEIGHT: 72 IN | HEART RATE: 75 BPM | SYSTOLIC BLOOD PRESSURE: 110 MMHG

## 2020-07-01 PROCEDURE — G8420 CALC BMI NORM PARAMETERS: HCPCS | Performed by: INTERNAL MEDICINE

## 2020-07-01 PROCEDURE — 99204 OFFICE O/P NEW MOD 45 MIN: CPT | Performed by: INTERNAL MEDICINE

## 2020-07-01 PROCEDURE — 1036F TOBACCO NON-USER: CPT | Performed by: INTERNAL MEDICINE

## 2020-07-01 PROCEDURE — G8427 DOCREV CUR MEDS BY ELIG CLIN: HCPCS | Performed by: INTERNAL MEDICINE

## 2020-07-01 PROCEDURE — 3017F COLORECTAL CA SCREEN DOC REV: CPT | Performed by: INTERNAL MEDICINE

## 2020-07-01 NOTE — PROGRESS NOTES
Roman [employer] service: Not on file     Active member of club or organization: Not on file     Attends meetings of clubs or organizations: Not on file     Relationship status: Not on file    Intimate partner violence     Fear of current or ex partner: Not on file     Emotionally abused: Not on file     Physically abused: Not on file     Forced sexual activity: Not on file   Other Topics Concern    Not on file   Social History Narrative    Not on file         Review of Systems      ROS: 10 organs review of system is done including general, psychological, ENT, hematological, endocrine, respiratory, cardiovascular, gastrointestinal,musculoskeletal, neurological,  allergy and Immunology is done and is otherwise negative. Current Outpatient Medications   Medication Sig Dispense Refill    omeprazole (PRILOSEC) 40 MG delayed release capsule Take 1 capsule by mouth daily 90 capsule 1    meclizine (ANTIVERT) 12.5 MG tablet Take 12.5 mg by mouth      finasteride (PROSCAR) 5 MG tablet Take 1 tablet by mouth daily 90 tablet 3     No current facility-administered medications for this visit. Objective:     Vitals:    07/01/20 1421   BP: 110/80   Site: Left Upper Arm   Position: Sitting   Pulse: 75   Resp: 14   Temp: 97.9 °F (36.6 °C)   TempSrc: Temporal   SpO2: 98%   Weight: 180 lb 12.8 oz (82 kg)   Height: 6' (1.829 m)         Physical Exam  Constitutional:       Appearance: He is well-developed. HENT:      Head: Normocephalic and atraumatic. Eyes:      General:         Left eye: No discharge. Conjunctiva/sclera: Conjunctivae normal.      Pupils: Pupils are equal, round, and reactive to light. Neck:      Musculoskeletal: Normal range of motion and neck supple. Vascular: No JVD. Cardiovascular:      Rate and Rhythm: Normal rate and regular rhythm. Heart sounds: Normal heart sounds. No murmur. No friction rub. No gallop.     Pulmonary:      Effort: Pulmonary effort is normal. No respiratory distress. Breath sounds: Normal breath sounds. No wheezing or rales. Chest:      Chest wall: No tenderness. Abdominal:      General: Bowel sounds are normal.      Palpations: Abdomen is soft. Musculoskeletal:         General: No tenderness or deformity. Right lower leg: No edema. Left lower leg: No edema. Skin:     General: Skin is warm and dry. Neurological:      Mental Status: He is alert and oriented to person, place, and time. Psychiatric:         Judgment: Judgment normal.         Imaging studies reviewed by me chest x-ray hyperinflated  Lab results reviewed in chart  PFT none       Assessment and Plan       Diagnosis Orders   1. SOB (shortness of breath)  Full PFT Study With Bronchodilator    Allergen, Inhalant, Comprehensive Panel    CBC With Auto Differential   2. Gastroesophageal reflux disease with esophagitis       · Shortness of breath, etiology is not clear, will obtain PFT and allergy testing evaluate for asthma. If PFT is normal will consider methacholine challenge test and CT of the chest.  · GERD control is essential this could contribute to hyperactive airway disease or bronchospasm which could explain his above symptoms. Patient follows up with GI and currently on PPI. Orders Placed This Encounter   Procedures    Allergen, Inhalant, Comprehensive Panel     Standing Status:   Future     Standing Expiration Date:   7/1/2021    CBC With Auto Differential     Standing Status:   Future     Standing Expiration Date:   7/1/2021    Full PFT Study With Bronchodilator     Standing Status:   Future     Standing Expiration Date:   7/31/2020     No orders of the defined types were placed in this encounter. Discussed with patient the importance of exercise and weight control and  overall health and well-being. Reviewed with the patient: current clinical status, medications, activities and diet.       Side effects, adverse effects of the medication prescribed today, as well as treatment plan and result expectations have been discussed with the patient who expresses understanding and desires to proceed. Return in about 6 weeks (around 8/12/2020).       Radha Biswas MD

## 2020-07-14 ENCOUNTER — TELEPHONE (OUTPATIENT)
Dept: GASTROENTEROLOGY | Age: 50
End: 2020-07-14

## 2020-07-14 ENCOUNTER — VIRTUAL VISIT (OUTPATIENT)
Dept: GASTROENTEROLOGY | Age: 50
End: 2020-07-14
Payer: COMMERCIAL

## 2020-07-14 ENCOUNTER — HOSPITAL ENCOUNTER (OUTPATIENT)
Dept: NON INVASIVE DIAGNOSTICS | Age: 50
Discharge: HOME | End: 2020-07-14
Payer: COMMERCIAL

## 2020-07-14 PROBLEM — K22.70 BARRETT'S ESOPHAGUS WITHOUT DYSPLASIA: Status: ACTIVE | Noted: 2020-07-14

## 2020-07-14 PROCEDURE — G8427 DOCREV CUR MEDS BY ELIG CLIN: HCPCS | Performed by: NURSE PRACTITIONER

## 2020-07-14 PROCEDURE — 1036F TOBACCO NON-USER: CPT | Performed by: NURSE PRACTITIONER

## 2020-07-14 PROCEDURE — 93350 STRESS TTE ONLY: CPT

## 2020-07-14 PROCEDURE — 93017 CV STRESS TEST TRACING ONLY: CPT

## 2020-07-14 PROCEDURE — 3017F COLORECTAL CA SCREEN DOC REV: CPT | Performed by: NURSE PRACTITIONER

## 2020-07-14 PROCEDURE — G8420 CALC BMI NORM PARAMETERS: HCPCS | Performed by: NURSE PRACTITIONER

## 2020-07-14 PROCEDURE — 99213 OFFICE O/P EST LOW 20 MIN: CPT | Performed by: NURSE PRACTITIONER

## 2020-07-14 ASSESSMENT — ENCOUNTER SYMPTOMS
TROUBLE SWALLOWING: 0
ANAL BLEEDING: 0
PHOTOPHOBIA: 0
RECTAL PAIN: 0
CONSTIPATION: 0
EYE PAIN: 0
EYE REDNESS: 0
ABDOMINAL PAIN: 1
BLOOD IN STOOL: 0
DIARRHEA: 0
NAUSEA: 0
COLOR CHANGE: 0
ABDOMINAL DISTENTION: 0
VOICE CHANGE: 0
CHEST TIGHTNESS: 0
VOMITING: 0

## 2020-07-14 NOTE — TELEPHONE ENCOUNTER
Spoke to the patient wife she would like to schedule a f/u on 8/17/20 with Carolin Marin. Please advise if its ok to over book on this day. Her  will be off that Monday.

## 2020-07-14 NOTE — PROGRESS NOTES
2020    TELEHEALTH EVALUATION -- Audio/Visual (During [employer]NTE[employer]-80 public health emergency)    Due to [employer] 19 outbreak, patient's office visit was converted to a virtual visit. Patient was contacted and agreed to proceed with a virtual visit via [employer]. me  The risks and benefits of converting to a virtual visit were discussed in light of the current infectious disease epidemic. Patient also understood that insurance coverage and co-pays are up to their individual insurance plans. HPI:    Mark Ovalle (:  1970) has requested an audio/video evaluation for the following concern(s): interval visit for GERD, pt was previously seen in the office for GERD and underwent an EGD, had persistent epigastric pain with negative cardiac workup and was referred back to us for further evaluation. At his previous visit it was noted that he had been taking Prilosec 20 mg once daily however he was prescribed 40 once daily, drinking 6 cups of coffee a day, taking 2 Aleve a day, drinking 3-4 beers a day, and eating within 1 hour of bedtime, he was prescribed prilosec 40 mg daily and instructed to make significant lifestyle changes, in follow up today he reports some improvement in symptoms, but has intermittent epigastric pain with SOB he notes that his symptoms primarily occur in the PM and with activity, since our last visit he reports significant lifestyle changes including minimizing caffeine, alcohol, he stopped aleve, does report eating late at night due to lifestyle and work restrictions. He has also previously seen cardiology and pulmonology for these symptoms, is scheduled today for cardiac stress test today and future PFTs. Endoscopic hx: 20 Dr John Hutchinson  Irregular Z line, short segment Harris's, fundic gland polyps  bx consistent with Harris's no dysplasia, and mild chronic gastroesophagitis. Background:   This is a very pleasant 57-year-old that came in today for the evaluation management of GERD = Pathophysiology and etiology of reflux discussed at length  = Lifestyle modification recommended and discussed with the patient              --Avoid spicy and acidic based foods              --Limit coffee, tea, alcohol use              --Limit the amount of food during meal time              --Avoid bedtime snacks and eat meals 3 to 4 hrs before lying down              --Elevate the head of the bed               --Keep healthy weight              --avoid NSAIDs              --avoid ETOH  2. No significant Associated medical conditions         Return in about 4 weeks (around 8/11/2020). An  electronic signature was used to authenticate this note. --CHELSEA Wheat CNP on 7/14/2020 at 12:05 PM        Pursuant to the emergency declaration under the [facility]5 waiver authority and the [employer] Act, this Virtual  Visit was conducted, with patient's consent, to reduce the patient's risk of exposure to COVID-19 and provide continuity of care for an established patient. Services were provided through a video synchronous discussion virtually to substitute for in-person clinic visit.

## 2020-07-14 NOTE — PROGRESS NOTES
Hx,allergies and medications reviewed. Procedure explained and informed consent obtained. 1250 Baseline four echo images started. Tolerated treadmill very well for 11:14 minutes. Reached 102% target HR. Final four echo images obtained post exercise. SOB reported. Returned to baseline in recovery. Denies chest pain or pressure. EKG shows frequent pvc's pre exercise.

## 2020-07-30 ENCOUNTER — HOSPITAL ENCOUNTER (OUTPATIENT)
Dept: PULMONOLOGY | Age: 50
Discharge: HOME | End: 2020-07-30
Payer: COMMERCIAL

## 2020-07-30 PROCEDURE — 94729 DIFFUSING CAPACITY: CPT | Performed by: INTERNAL MEDICINE

## 2020-07-30 PROCEDURE — 94726 PLETHYSMOGRAPHY LUNG VOLUMES: CPT

## 2020-07-30 PROCEDURE — 94729 DIFFUSING CAPACITY: CPT

## 2020-07-30 PROCEDURE — 94010 BREATHING CAPACITY TEST: CPT | Performed by: INTERNAL MEDICINE

## 2020-07-30 PROCEDURE — 94010 BREATHING CAPACITY TEST: CPT

## 2020-07-30 PROCEDURE — 94726 PLETHYSMOGRAPHY LUNG VOLUMES: CPT | Performed by: INTERNAL MEDICINE

## 2020-08-06 NOTE — PROCEDURES
Complete pulmonary function testing were done on this 44year-old patient who's height is 72 inches and weight is 180 pounds with remote 3-year smoking history quit 20 years ago     Indications  Dyspnea    Spirometry  FVC is 4.67 liters which is 87 % of predicted, FEV1 is 3.71 liters which is 90 % of predicted, FEV1/FVC ratio is 80% which is normal  Flow volume loop appears unremarkable, FEF 25-75% is 3.39 L/Sec, which is 95 % of predicted  No bronchodilator therapy given    Lung volumes  Done by body plethysmography and showed a total lung capacity of 6.67 liters which is 90 % of predicted, residual volume is 2.03 liters which is 92 % of predicted  RV/TLC ratio is 30 % which is normal    Diffusion capacity  Is 33.84 ml/Min/mmHg which is normal at 117 % of predicted    Specific airway resistance is normal, specific airway conductance is normal    Overall impression  This study was generally normal with no obstructive, restrictive, or diffusion impairment noted    Electronically signed by James Salinas MD, FCCP on 8/6/2020 at 12:15 PM

## 2020-08-17 ENCOUNTER — VIRTUAL VISIT (OUTPATIENT)
Dept: GASTROENTEROLOGY | Age: 50
End: 2020-08-17
Payer: COMMERCIAL

## 2020-08-17 ENCOUNTER — OFFICE VISIT (OUTPATIENT)
Dept: PULMONOLOGY | Age: 50
End: 2020-08-17
Payer: COMMERCIAL

## 2020-08-17 VITALS
SYSTOLIC BLOOD PRESSURE: 106 MMHG | BODY MASS INDEX: 24.38 KG/M2 | OXYGEN SATURATION: 98 % | TEMPERATURE: 98.1 F | DIASTOLIC BLOOD PRESSURE: 64 MMHG | HEIGHT: 72 IN | WEIGHT: 180 LBS | HEART RATE: 74 BPM

## 2020-08-17 PROCEDURE — 99213 OFFICE O/P EST LOW 20 MIN: CPT | Performed by: INTERNAL MEDICINE

## 2020-08-17 PROCEDURE — 99213 OFFICE O/P EST LOW 20 MIN: CPT | Performed by: NURSE PRACTITIONER

## 2020-08-17 PROCEDURE — 3017F COLORECTAL CA SCREEN DOC REV: CPT | Performed by: NURSE PRACTITIONER

## 2020-08-17 PROCEDURE — 1036F TOBACCO NON-USER: CPT | Performed by: INTERNAL MEDICINE

## 2020-08-17 PROCEDURE — G8420 CALC BMI NORM PARAMETERS: HCPCS | Performed by: NURSE PRACTITIONER

## 2020-08-17 PROCEDURE — 3017F COLORECTAL CA SCREEN DOC REV: CPT | Performed by: INTERNAL MEDICINE

## 2020-08-17 PROCEDURE — 1036F TOBACCO NON-USER: CPT | Performed by: NURSE PRACTITIONER

## 2020-08-17 PROCEDURE — G8420 CALC BMI NORM PARAMETERS: HCPCS | Performed by: INTERNAL MEDICINE

## 2020-08-17 PROCEDURE — G8427 DOCREV CUR MEDS BY ELIG CLIN: HCPCS | Performed by: INTERNAL MEDICINE

## 2020-08-17 PROCEDURE — G8427 DOCREV CUR MEDS BY ELIG CLIN: HCPCS | Performed by: NURSE PRACTITIONER

## 2020-08-17 RX ORDER — PANTOPRAZOLE SODIUM 40 MG/1
40 TABLET, DELAYED RELEASE ORAL 2 TIMES DAILY
Qty: 60 TABLET | Refills: 1 | Status: SHIPPED | OUTPATIENT
Start: 2020-08-17 | End: 2020-09-08

## 2020-08-17 ASSESSMENT — ENCOUNTER SYMPTOMS
TROUBLE SWALLOWING: 0
CONSTIPATION: 0
DIARRHEA: 0
EYE PAIN: 0
NAUSEA: 0
PHOTOPHOBIA: 0
BLOOD IN STOOL: 0
VOMITING: 0
RECTAL PAIN: 0
CHEST TIGHTNESS: 0
ABDOMINAL DISTENTION: 0
EYE REDNESS: 0
ABDOMINAL PAIN: 0
ANAL BLEEDING: 0
VOICE CHANGE: 0
COLOR CHANGE: 0

## 2020-08-17 NOTE — PROGRESS NOTES
Subjective:     Megan Olivares is a 44 y.o. male who complains today of:     Chief Complaint   Patient presents with    Shortness of Breath     follow up       HPI  Patient presents for shortness of breath follow-up    Shortness of breath has resolved, he continues to have reflux symptoms, denies chest pain, no coughing, sleeps well, feels well, no limitation in his physical activity, no nausea no vomiting he has improved since I seen him last.            Allergies:  Patient has no known allergies.   Past Medical History:   Diagnosis Date    Alopecia     Hypercholesteremia 6/18/2020    Precordial pain 6/18/2020     Past Surgical History:   Procedure Laterality Date    NOSE SURGERY       Family History   Problem Relation Age of Onset    Heart Disease Brother     Colon Cancer Neg Hx      Social History     Socioeconomic History    Marital status:      Spouse name: Not on file    Number of children: Not on file    Years of education: Not on file    Highest education level: Not on file   Occupational History    Not on file   Social Needs    Financial resource strain: Not hard at all   [employer] insecurity     Worry: Never true     Inability: Never true   [employer] needs     Medical: Not on file     Non-medical: Not on file   Tobacco Use    Smoking status: Never Smoker    Smokeless tobacco: Never Used   Substance and Sexual Activity    Alcohol use: Yes     Comment: 2 beers daily    Drug use: No    Sexual activity: Not on file   Lifestyle    Physical activity     Days per week: Not on file     Minutes per session: Not on file    Stress: Not on file   Relationships    Social connections     Talks on phone: Not on file     Gets together: Not on file     Attends Jainism service: Not on file     Active member of club or organization: Not on file     Attends meetings of clubs or organizations: Not on file     Relationship status: Not on file    Intimate partner violence     Fear of current or ex partner: Not on file     Emotionally abused: Not on file     Physically abused: Not on file     Forced sexual activity: Not on file   Other Topics Concern    Not on file   Social History Narrative    Not on file         Review of Systems      ROS: 10 organs review of system is done including general, psychological, ENT, hematological, endocrine, respiratory, cardiovascular, gastrointestinal,musculoskeletal, neurological,  allergy and Immunology is done and is otherwise negative. Current Outpatient Medications   Medication Sig Dispense Refill    pantoprazole (PROTONIX) 40 MG tablet Take 1 tablet by mouth 2 times daily 60 tablet 1    meclizine (ANTIVERT) 12.5 MG tablet Take 12.5 mg by mouth      finasteride (PROSCAR) 5 MG tablet Take 1 tablet by mouth daily (Patient taking differently: Take 2.5 mg by mouth daily ) 90 tablet 3     No current facility-administered medications for this visit. Objective:     Vitals:    08/17/20 1354   BP: 106/64   Pulse: 74   Temp: 98.1 °F (36.7 °C)   SpO2: 98%   Weight: 180 lb (81.6 kg)   Height: 6' (1.829 m)         Physical Exam  Constitutional:       Appearance: He is well-developed. HENT:      Head: Normocephalic and atraumatic. Eyes:      General:         Left eye: No discharge. Conjunctiva/sclera: Conjunctivae normal.      Pupils: Pupils are equal, round, and reactive to light. Neck:      Musculoskeletal: Normal range of motion and neck supple. Vascular: No JVD. Cardiovascular:      Rate and Rhythm: Normal rate and regular rhythm. Heart sounds: Normal heart sounds. No murmur. No friction rub. No gallop. Pulmonary:      Effort: Pulmonary effort is normal. No respiratory distress. Breath sounds: Normal breath sounds. No wheezing or rales. Chest:      Chest wall: No tenderness. Abdominal:      General: Bowel sounds are normal.      Palpations: Abdomen is soft. Musculoskeletal:         General: No tenderness or deformity. Right lower leg: No edema. Left lower leg: No edema. Skin:     General: Skin is warm and dry. Neurological:      Mental Status: He is alert and oriented to person, place, and time. Psychiatric:         Judgment: Judgment normal.         Imaging studies reviewed by me chest x-ray no mass or infiltrate  Lab results reviewed in chart  PFT normal      Assessment and Plan       Diagnosis Orders   1. SOB (shortness of breath)     2. Gastroesophageal reflux disease with esophagitis       · Shortness of breath, resolved likely related to GERD with associated bronchospasm  · Spirometry is normal  · Chest x-ray is unremarkable  · Patient currently follows up with GI he is on PPI, continue same, consider surgical option if symptoms persisted  · Meanwhile no significant pulmonary symptoms he will call me if needed. No orders of the defined types were placed in this encounter. No orders of the defined types were placed in this encounter. Discussed with patient the importance of exercise and weight control and  overall health and well-being. Reviewed with the patient: current clinical status, medications, activities and diet. Side effects, adverse effects of the medication prescribed today, as well as treatment plan and result expectations have been discussed with the patient who expresses understanding and desires to proceed. Return if symptoms worsen or fail to improve.       Oscar Ross MD

## 2020-08-17 NOTE — PROGRESS NOTES
2020    TELEHEALTH EVALUATION -- Audio/Visual (During DSB-66 public health emergency)    Due to [employer] 19 outbreak, patient's office visit was converted to a virtual visit. Patient was contacted and agreed to proceed with a virtual visit via Telephone Visit, 15 minutes  The risks and benefits of converting to a virtual visit were discussed in light of the current infectious disease epidemic. Patient also understood that insurance coverage and co-pays are up to their individual insurance plans. HPI:    Robb Jazzy (:  1970) has requested an audio/video evaluation for the following concern(s): Follow-up to GERD, pt was previously sen in the office for GERD symptoms underwent EGD results previously d/w patient, has been on double dose PPI for 1 month and Pepcid as needed, was previously instructed to make significant lifestyle changes including limiting caffeine, NSAIDs, alcohol, and still eating late at night. He reports improvement in his symptoms however does experience heartburn multiple times over the course of the week, primarily while at work and with physical activity. Associate worsening symptoms with carbonated beverages. No nausea or vomiting, hematemesis, melena, or hematochezia. OV 20   interval visit for GERD, pt was previously seen in the office for GERD and underwent an EGD, had persistent epigastric pain with negative cardiac workup and was referred back to us for further evaluation.   At his previous visit it was noted that he had been taking Prilosec 20 mg once daily however he was prescribed 40 once daily, drinking 6 cups of coffee a day, taking 2 Aleve a day, drinking 3-4 beers a day, and eating within 1 hour of bedtime, he was prescribed prilosec 40 mg daily and instructed to make significant lifestyle changes, in follow up today he reports some improvement in symptoms, but has intermittent epigastric pain with SOB he notes that his symptoms primarily occur in the PM and with activity, since our last visit he reports significant lifestyle changes including minimizing caffeine, alcohol, he stopped aleve, does report eating late at night due to lifestyle and work restrictions. He has also previously seen cardiology and pulmonology for these symptoms, is scheduled today for cardiac stress test today and future PFTs. Background  Endoscopic hx: 2/24/20 Dr Pepe Masters  Irregular Z line, short segment Harris's, fundic gland polyps  bx consistent with Harris's no dysplasia, and mild chronic gastroesophagitis. Background: This is a very pleasant 20-year-old that came in today for the evaluation management of GERD and loose stool.  Patient with longstanding history of GERD has been on any PPI as of lately Nelly Schmitt does report sensation of heartburn or discomfort.  He he mentioned that symptoms get worse in winter months and resolve in summer.  Patient report this is dependent on the medication. [facility] mentioned that at some point he was off PPI while in [place] however symptoms were severe.  Denies any dysphasia or difficulty swallowing.  Patient also has been reporting watery stool and loose stool.  Denies any hematemesis hematochezia.  Had blood work that shows no evidence of anemia.      Review of Systems   Constitutional: Negative. Negative for chills, fatigue and fever. HENT: Negative for nosebleeds, tinnitus, trouble swallowing and voice change. Eyes: Negative for photophobia, pain and redness. Respiratory: Negative for chest tightness. Cardiovascular: Negative for chest pain, palpitations and leg swelling. Gastrointestinal: Negative for abdominal distention, abdominal pain, anal bleeding, blood in stool, constipation, diarrhea, nausea, rectal pain and vomiting. Endocrine: Negative for polydipsia, polyphagia and polyuria. Genitourinary: Negative for difficulty urinating and hematuria. Skin: Negative for color change, pallor and rash.    Neurological: Negative for dizziness, speech difficulty and headaches. Psychiatric/Behavioral: Negative for confusion, sleep disturbance and suicidal ideas. Prior to Visit Medications    Medication Sig Taking?  Authorizing Provider   pantoprazole (PROTONIX) 40 MG tablet Take 1 tablet by mouth 2 times daily Yes CHELSEA Arguello CNP   omeprazole (PRILOSEC) 40 MG delayed release capsule Take 1 capsule by mouth daily Yes CHELSEA Arguello CNP   meclizine (ANTIVERT) 12.5 MG tablet Take 12.5 mg by mouth Yes Historical Provider, MD   finasteride (PROSCAR) 5 MG tablet Take 1 tablet by mouth daily  Patient taking differently: Take 2.5 mg by mouth daily  Yes Dixon Ross MD       Social History     Tobacco Use    Smoking status: Never Smoker    Smokeless tobacco: Never Used   Substance Use Topics    Alcohol use: Yes     Comment: 2 beers daily    Drug use: No        No Known Allergies,   Past Medical History:   Diagnosis Date    Alopecia     Hypercholesteremia 6/18/2020    Precordial pain 6/18/2020   ,   Past Surgical History:   Procedure Laterality Date    NOSE SURGERY     ,   Social History     Tobacco Use    Smoking status: Never Smoker    Smokeless tobacco: Never Used   Substance Use Topics    Alcohol use: Yes     Comment: 2 beers daily    Drug use: No   ,   Family History   Problem Relation Age of Onset    Heart Disease Brother     Colon Cancer Neg Hx        PHYSICAL EXAMINATION:  [ INSTRUCTIONS:  \"[x]\" Indicates a positive item  \"[]\" Indicates a negative item  -- DELETE ALL ITEMS NOT EXAMINED]  [] Alert  [] Oriented to person/place/time    [] No apparent distress  [] Toxic appearing    [] Face flushed appearing [] Sclera clear  [] Lips are cyanotic      [] Breathing appears normal  [] Appears tachypneic      [] Rash on visible skin    [] Cranial Nerves II-XII grossly intact    [] Motor grossly intact in visible upper extremities    [] Motor grossly intact in visible lower extremities    [] Normal Mood  [] Anxious appearing    [] Depressed appearing  [] Confused appearing      [] Poor short term memory  [] Poor long term memory    [] OTHER:      Due to this being a TeleHealth encounter, evaluation of the following organ systems is limited: Vitals/Constitutional/EENT/Resp/CV/GI//MS/Neuro/Skin/Heme-Lymph-Imm. ASSESSMENT/PLAN:  1. Harris's esophagus, heartburn, GERD  Previous endoscopic history from February 2020 showed mild gastritis and Harris's esophagus confirmed by biopsy, will need follow-up EGD in 3 years, has been on double dose PPI and H2 blocker at bedtime, recently had cardiac work-up including stress test was reported normal, has follow up today with pulmonology. His HB Symptoms occur more frequently while he works,  he does report a very stressful job and feels that this may be related. No nausea or vomiting, hematemesis, melena, or hematochezia  -Change PPI Protonix 40 mg twice daily, continue H2 blocker at bedtime as needed, antireflux lifestyle discussed at length.   -Discussed referral to tertiary center for pH impedance testing however at this time patient is not interested, will readdress as appropriate in future. 2.  No significant associated medical conditions      Return in about 2 months (around 10/17/2020). An  electronic signature was used to authenticate this note. --CHELSEA Gonzales - CNP on 8/17/2020 at 9:58 AM        Pursuant to the emergency declaration under the [facility] waiver authority and the [employer] Act, this Virtual  Visit was conducted, with patient's consent, to reduce the patient's risk of exposure to COVID-19 and provide continuity of care for an established patient. Services were provided through a video synchronous discussion virtually to substitute for in-person clinic visit.

## 2020-09-08 RX ORDER — PANTOPRAZOLE SODIUM 40 MG/1
TABLET, DELAYED RELEASE ORAL
Qty: 60 TABLET | Refills: 1 | Status: SHIPPED | OUTPATIENT
Start: 2020-09-08 | End: 2021-02-08

## 2020-10-19 ENCOUNTER — VIRTUAL VISIT (OUTPATIENT)
Dept: GASTROENTEROLOGY | Age: 50
End: 2020-10-19
Payer: COMMERCIAL

## 2020-10-19 PROCEDURE — G8420 CALC BMI NORM PARAMETERS: HCPCS | Performed by: NURSE PRACTITIONER

## 2020-10-19 PROCEDURE — 1036F TOBACCO NON-USER: CPT | Performed by: NURSE PRACTITIONER

## 2020-10-19 PROCEDURE — G8484 FLU IMMUNIZE NO ADMIN: HCPCS | Performed by: NURSE PRACTITIONER

## 2020-10-19 PROCEDURE — 99213 OFFICE O/P EST LOW 20 MIN: CPT | Performed by: NURSE PRACTITIONER

## 2020-10-19 PROCEDURE — G8427 DOCREV CUR MEDS BY ELIG CLIN: HCPCS | Performed by: NURSE PRACTITIONER

## 2020-10-19 PROCEDURE — 3017F COLORECTAL CA SCREEN DOC REV: CPT | Performed by: NURSE PRACTITIONER

## 2020-10-19 RX ORDER — ESOMEPRAZOLE MAGNESIUM 40 MG/1
40 CAPSULE, DELAYED RELEASE ORAL DAILY
Qty: 90 CAPSULE | Refills: 2 | Status: SHIPPED | OUTPATIENT
Start: 2020-10-19 | End: 2021-07-16

## 2020-10-19 RX ORDER — METRONIDAZOLE 10 MG/G
GEL TOPICAL
COMMUNITY
Start: 2020-08-13

## 2020-10-19 ASSESSMENT — ENCOUNTER SYMPTOMS
ANAL BLEEDING: 0
VOMITING: 0
ABDOMINAL PAIN: 0
PHOTOPHOBIA: 0
BLOOD IN STOOL: 0
NAUSEA: 0
TROUBLE SWALLOWING: 0
ABDOMINAL DISTENTION: 0
EYE PAIN: 0
CHEST TIGHTNESS: 0
RECTAL PAIN: 0
DIARRHEA: 0
VOICE CHANGE: 0
EYE REDNESS: 0
COLOR CHANGE: 0
CONSTIPATION: 0

## 2020-10-19 NOTE — PROGRESS NOTES
10/19/2020    TELEHEALTH EVALUATION -- Audio/Visual (During [facility]-81 public health emergency)    Due to COVID 19 outbreak, patient's office visit was converted to a virtual visit. Patient was contacted and agreed to proceed with a virtual visit via Telephone Visit, 15 minutes  The risks and benefits of converting to a virtual visit were discussed in light of the current infectious disease epidemic. Patient also understood that insurance coverage and co-pays are up to their individual insurance plans. HPI:    Jameson Steel (:  1970) has requested an audio/video evaluation for the following concern(s): Follow-up to acid reflux, patient carries a diagnosis of Harris's esophagus has chronic acid reflux, has been on double dose PPI and Pepcid at at bedtime. Reports persistent intermittent symptoms cannot associate his symptoms with meals. Of note patient does mention that he was off of work for 3 weeks and noticed his symptoms were significantly improved he is concerned that there is a correlation between his level of stress at work and his symptoms. He denies nausea or vomiting, hematemesis, melena, or hematochezia. He is previously made significant lifestyle changes including limiting caffeine, NSAIDs, alcohol and eating late at night, he does note some improvement since following an antireflux lifestyle. Endoscopic hx: 20 Dr Radha Gan  Irregular Z line, short segment Harris's, fundic gland polyps  bx consistent with Harris's no dysplasia, and mild chronic gastroesophagitis. Background:   This is a very pleasant 23-year-old that came in today for the evaluation management of GERD and loose stool.  Patient with longstanding history of GERD has been on any PPI as of lately Frank Loser does report sensation of heartburn or discomfort.  He he mentioned that symptoms get worse in winter months and resolve in summer.  Patient report this is dependent on the medication.  He mentioned that at some point he was off PPI while in [place] however symptoms were severe.  Denies any dysphasia or difficulty swallowing.  Patient also has been reporting watery stool and loose stool.  Denies any hematemesis hematochezia.  Had blood work that shows no evidence of anemia  Review of Systems   Constitutional: Negative. Negative for chills, fatigue and fever. HENT: Negative for nosebleeds, tinnitus, trouble swallowing and voice change. Eyes: Negative for photophobia, pain and redness. Respiratory: Negative for chest tightness. Cardiovascular: Negative for chest pain, palpitations and leg swelling. Gastrointestinal: Negative for abdominal distention, abdominal pain, anal bleeding, blood in stool, constipation, diarrhea, nausea, rectal pain and vomiting. Endocrine: Negative for polydipsia, polyphagia and polyuria. Genitourinary: Negative for difficulty urinating and hematuria. Skin: Negative for color change, pallor and rash. Neurological: Negative for dizziness, speech difficulty and headaches. Psychiatric/Behavioral: Negative for confusion, sleep disturbance and suicidal ideas. Prior to Visit Medications    Medication Sig Taking?  Authorizing Provider   metroNIDAZOLE (METROGEL) 1 % gel APPLY TO THE NOSE TWICE A DAY Yes Historical Provider, MD   esomeprazole (NEXIUM) 40 MG delayed release capsule Take 1 capsule by mouth daily Yes CHELSEA Mosher CNP   pantoprazole (PROTONIX) 40 MG tablet TAKE 1 TABLET BY MOUTH TWICE A DAY Yes CHELSEA Mosher CNP   meclizine (ANTIVERT) 12.5 MG tablet Take 12.5 mg by mouth Yes Historical Provider, MD   finasteride (PROSCAR) 5 MG tablet Take 1 tablet by mouth daily  Patient taking differently: Take 2.5 mg by mouth daily  Yes Jodi Robledo MD       Social History     Tobacco Use    Smoking status: Never Smoker    Smokeless tobacco: Never Used   Substance Use Topics    Alcohol use: Yes     Comment: 2 beers daily    Drug use: No        No Known Allergies,   Past Medical History:   Diagnosis Date    Alopecia     Hypercholesteremia 6/18/2020    Precordial pain 6/18/2020   ,   Past Surgical History:   Procedure Laterality Date    NOSE SURGERY     ,   Social History     Tobacco Use    Smoking status: Never Smoker    Smokeless tobacco: Never Used   Substance Use Topics    Alcohol use: Yes     Comment: 2 beers daily    Drug use: No   ,   Family History   Problem Relation Age of Onset    Heart Disease Brother     Colon Cancer Neg Hx        PHYSICAL EXAMINATION:  [ INSTRUCTIONS:  \"[x]\" Indicates a positive item  \"[]\" Indicates a negative item  -- DELETE ALL ITEMS NOT EXAMINED]  [] Alert  [] Oriented to person/place/time    [] No apparent distress  [] Toxic appearing    [] Face flushed appearing [] Sclera clear  [] Lips are cyanotic      [] Breathing appears normal  [] Appears tachypneic      [] Rash on visible skin    [] Cranial Nerves II-XII grossly intact    [] Motor grossly intact in visible upper extremities    [] Motor grossly intact in visible lower extremities    [] Normal Mood  [] Anxious appearing    [] Depressed appearing  [] Confused appearing      [] Poor short term memory  [] Poor long term memory    [] OTHER:      Due to this being a TeleHealth encounter, evaluation of the following organ systems is limited: Vitals/Constitutional/EENT/Resp/CV/GI//MS/Neuro/Skin/Heme-Lymph-Imm. ASSESSMENT/PLAN:  1. Harris's esophagus, heartburn, GERD  Previous endoscopic history from February 2020 showed mild gastritis and Harris's esophagus, patient has been on double dose PPI and H2 blocker at bedtime, notes persistent intermittent symptoms no correlation to diet. Has followed an antireflux lifestyle for some improvement. Had previous negative cardiac and pulmonology work-up.   Patient does report that he was recently off of work on vacation for 3 weeks and noted significant improvement in his symptoms he does feel his stressful job contributes to his symptoms  -Change PPI to Nexium 40 mg once daily, continue H2 blocker at at bedtime as needed  -Follow antireflux lifestyle  -Previously referred for pH impedance testing however patient refused  -Follow-up with PCP for low-dose antianxiety medication to see if there is an improvement in correlation symptoms  -Follow-up EGD in 3 years for Harris's surveillance 2023  2. No significant associated medical conditions      Return if symptoms worsen or fail to improve. An  electronic signature was used to authenticate this note. --CHELSEA Landa - CNP on 10/19/2020 at 8:30 AM        Pursuant to the emergency declaration under the [facility], 1135 waiver authority and the [employer] Act, this Virtual  Visit was conducted, with patient's consent, to reduce the patient's risk of exposure to COVID-19 and provide continuity of care for an established patient. Services were provided through a video synchronous discussion virtually to substitute for in-person clinic visit.

## 2021-02-08 RX ORDER — PANTOPRAZOLE SODIUM 40 MG/1
TABLET, DELAYED RELEASE ORAL
Qty: 180 TABLET | Refills: 0 | Status: SHIPPED | OUTPATIENT
Start: 2021-02-08 | End: 2021-11-08 | Stop reason: SDUPTHER

## 2021-11-08 RX ORDER — PANTOPRAZOLE SODIUM 40 MG/1
40 TABLET, DELAYED RELEASE ORAL DAILY
Qty: 90 TABLET | Refills: 3 | Status: SHIPPED | OUTPATIENT
Start: 2021-11-08 | End: 2022-11-03

## 2022-04-28 ENCOUNTER — TELEPHONE (OUTPATIENT)
Dept: PRIMARY CARE CLINIC | Age: 52
End: 2022-04-28

## 2023-12-01 RX ORDER — BENZONATATE 200 MG/1
200 CAPSULE ORAL 3 TIMES DAILY PRN
Qty: 30 CAPSULE | Refills: 0 | Status: SHIPPED | OUTPATIENT
Start: 2023-12-01 | End: 2023-12-11